# Patient Record
Sex: FEMALE | Race: OTHER | HISPANIC OR LATINO | Employment: FULL TIME | URBAN - METROPOLITAN AREA
[De-identification: names, ages, dates, MRNs, and addresses within clinical notes are randomized per-mention and may not be internally consistent; named-entity substitution may affect disease eponyms.]

---

## 2021-07-05 LAB
BILIRUB SERPL-MCNC: NEGATIVE MG/DL
BLOOD URINE, POC: NEGATIVE
CLARITY, POC UA: CLEAR
COLOR, POC UA: YELLOW
GLUCOSE UR QL STRIP: NEGATIVE
KETONES UR QL STRIP: NEGATIVE
LEUKOCYTE EST, POC UA: NORMAL
NITRITE, POC UA: NEGATIVE
PH, POC UA: 5
POC BETA-HCG (QUAL): NEGATIVE
PROTEIN, POC: NEGATIVE
SPECIFIC GRAVITY, POC UA: 1.02
UROBILINOGEN, POC UA: NORMAL

## 2022-04-10 ENCOUNTER — HISTORICAL (OUTPATIENT)
Dept: ADMINISTRATIVE | Facility: HOSPITAL | Age: 22
End: 2022-04-10
Payer: COMMERCIAL

## 2022-04-29 VITALS
DIASTOLIC BLOOD PRESSURE: 80 MMHG | HEIGHT: 63 IN | OXYGEN SATURATION: 99 % | SYSTOLIC BLOOD PRESSURE: 123 MMHG | WEIGHT: 155 LBS | BODY MASS INDEX: 27.46 KG/M2

## 2022-07-13 ENCOUNTER — OFFICE VISIT (OUTPATIENT)
Dept: URGENT CARE | Facility: CLINIC | Age: 22
End: 2022-07-13
Payer: COMMERCIAL

## 2022-07-13 VITALS
HEART RATE: 106 BPM | WEIGHT: 150 LBS | RESPIRATION RATE: 20 BRPM | TEMPERATURE: 100 F | BODY MASS INDEX: 26.58 KG/M2 | HEIGHT: 63 IN | SYSTOLIC BLOOD PRESSURE: 118 MMHG | DIASTOLIC BLOOD PRESSURE: 75 MMHG | OXYGEN SATURATION: 96 %

## 2022-07-13 DIAGNOSIS — J02.9 SORE THROAT: Primary | ICD-10-CM

## 2022-07-13 DIAGNOSIS — U07.1 COVID-19: ICD-10-CM

## 2022-07-13 LAB
CTP QC/QA: YES
SARS-COV-2 RDRP RESP QL NAA+PROBE: POSITIVE

## 2022-07-13 PROCEDURE — 3078F DIAST BP <80 MM HG: CPT | Mod: CPTII,,, | Performed by: PHYSICIAN ASSISTANT

## 2022-07-13 PROCEDURE — 1159F PR MEDICATION LIST DOCUMENTED IN MEDICAL RECORD: ICD-10-PCS | Mod: CPTII,,, | Performed by: PHYSICIAN ASSISTANT

## 2022-07-13 PROCEDURE — 3008F BODY MASS INDEX DOCD: CPT | Mod: CPTII,,, | Performed by: PHYSICIAN ASSISTANT

## 2022-07-13 PROCEDURE — 3008F PR BODY MASS INDEX (BMI) DOCUMENTED: ICD-10-PCS | Mod: CPTII,,, | Performed by: PHYSICIAN ASSISTANT

## 2022-07-13 PROCEDURE — 3074F PR MOST RECENT SYSTOLIC BLOOD PRESSURE < 130 MM HG: ICD-10-PCS | Mod: CPTII,,, | Performed by: PHYSICIAN ASSISTANT

## 2022-07-13 PROCEDURE — U0002 COVID-19 LAB TEST NON-CDC: HCPCS | Mod: QW,,, | Performed by: PHYSICIAN ASSISTANT

## 2022-07-13 PROCEDURE — 1159F MED LIST DOCD IN RCRD: CPT | Mod: CPTII,,, | Performed by: PHYSICIAN ASSISTANT

## 2022-07-13 PROCEDURE — 3074F SYST BP LT 130 MM HG: CPT | Mod: CPTII,,, | Performed by: PHYSICIAN ASSISTANT

## 2022-07-13 PROCEDURE — 1160F RVW MEDS BY RX/DR IN RCRD: CPT | Mod: CPTII,,, | Performed by: PHYSICIAN ASSISTANT

## 2022-07-13 PROCEDURE — U0002: ICD-10-PCS | Mod: QW,,, | Performed by: PHYSICIAN ASSISTANT

## 2022-07-13 PROCEDURE — 99203 PR OFFICE/OUTPT VISIT, NEW, LEVL III, 30-44 MIN: ICD-10-PCS | Mod: ,,, | Performed by: PHYSICIAN ASSISTANT

## 2022-07-13 PROCEDURE — 1160F PR REVIEW ALL MEDS BY PRESCRIBER/CLIN PHARMACIST DOCUMENTED: ICD-10-PCS | Mod: CPTII,,, | Performed by: PHYSICIAN ASSISTANT

## 2022-07-13 PROCEDURE — 3078F PR MOST RECENT DIASTOLIC BLOOD PRESSURE < 80 MM HG: ICD-10-PCS | Mod: CPTII,,, | Performed by: PHYSICIAN ASSISTANT

## 2022-07-13 PROCEDURE — 99203 OFFICE O/P NEW LOW 30 MIN: CPT | Mod: ,,, | Performed by: PHYSICIAN ASSISTANT

## 2022-07-13 NOTE — PATIENT INSTRUCTIONS
Drink plenty of fluids    Get plenty of rest.    Follow-up with your primary care doctor      Go to emergency department with any significant change or worsening symptoms.    Tylenol or Motrin as needed for fever.     Please quarantine for 5 days. On day 6 of illness you can return to work/school as long as you have not experienced fever in the last 24 hours.     Please add vitamin C, vitamin D, and Zinc if you do not already take these.

## 2022-07-13 NOTE — PROGRESS NOTES
"Subjective:       Patient ID: Aubrie Donald is a 22 y.o. female.    Vitals:  height is 5' 3" (1.6 m) and weight is 68 kg (150 lb). Her temperature is 99.7 °F (37.6 °C). Her blood pressure is 118/75 and her pulse is 106. Her respiration is 20 and oxygen saturation is 96%.     Chief Complaint: Sinus Problem    Dad tested positive today. Pt started having symptoms yesterday: headache, nasal congestion, sore throat. Pt took Nyquil.  Denies any coughing neck stiffness rash shortness of breath GI symptoms.    ROS    Objective:      Physical Exam   Constitutional: She is oriented to person, place, and time. She appears well-developed. She is cooperative.  Non-toxic appearance. She does not appear ill. No distress.   HENT:   Head: Normocephalic and atraumatic.   Ears:   Right Ear: Hearing normal.   Left Ear: Hearing normal.   Eyes: Conjunctivae and lids are normal. No scleral icterus.   Neck: Trachea normal and phonation normal. Neck supple. No edema present. No erythema present. No neck rigidity present.   Cardiovascular: Normal rate, regular rhythm, normal heart sounds and normal pulses.   Pulmonary/Chest: Effort normal and breath sounds normal. No respiratory distress. She has no decreased breath sounds. She has no rhonchi.   Abdominal: Normal appearance.   Musculoskeletal: Normal range of motion.         General: No deformity. Normal range of motion.   Neurological: She is alert and oriented to person, place, and time. She exhibits normal muscle tone. Coordination normal.   Skin: Skin is warm, dry, intact, not diaphoretic and not pale.   Psychiatric: Her speech is normal and behavior is normal. Judgment and thought content normal.   Nursing note and vitals reviewed.         Previous History      Review of patient's allergies indicates:  No Known Allergies    Past Medical History:   Diagnosis Date    Infection of left eye      Current Outpatient Medications   Medication Instructions    estradiol cypionate " "(DEPO-ESTRADIOL) 5 mg/mL injection Intramuscular, Every 28 days     Past Surgical History:   Procedure Laterality Date    WISDOM TOOTH EXTRACTION       Family History   Problem Relation Age of Onset    No Known Problems Mother     Hypertension Father     Hyperlipidemia Father     No Known Problems Sister     No Known Problems Brother        Social History     Tobacco Use    Smoking status: Never Smoker    Smokeless tobacco: Never Used   Substance Use Topics    Alcohol use: Yes    Drug use: Never        Physical Exam      Vital Signs Reviewed   /75   Pulse 106   Temp 99.7 °F (37.6 °C)   Resp 20   Ht 5' 3" (1.6 m)   Wt 68 kg (150 lb)   SpO2 96%   BMI 26.57 kg/m²        Procedures    Procedures     Labs     Results for orders placed or performed in visit on 07/13/22   POCT COVID-19 Rapid Screening   Result Value Ref Range    POC Rapid COVID Positive (A) Negative     Acceptable Yes          Assessment:       1. Sore throat    2. COVID-19          Plan:         Sore throat  -     POCT COVID-19 Rapid Screening    COVID-19    Drink plenty of fluids    Get plenty of rest.    Follow-up with your primary care doctor      Go to emergency department with any significant change or worsening symptoms.    Tylenol or Motrin as needed for fever.     Please quarantine for 5 days. On day 6 of illness you can return to work/school as long as you have not experienced fever in the last 24 hours.     Please add vitamin C, vitamin D, and Zinc if you do not already take these.                  "

## 2022-09-15 ENCOUNTER — HISTORICAL (OUTPATIENT)
Dept: ADMINISTRATIVE | Facility: HOSPITAL | Age: 22
End: 2022-09-15
Payer: COMMERCIAL

## 2022-12-01 ENCOUNTER — OFFICE VISIT (OUTPATIENT)
Dept: URGENT CARE | Facility: CLINIC | Age: 22
End: 2022-12-01
Payer: COMMERCIAL

## 2022-12-01 VITALS
DIASTOLIC BLOOD PRESSURE: 63 MMHG | TEMPERATURE: 99 F | SYSTOLIC BLOOD PRESSURE: 108 MMHG | OXYGEN SATURATION: 100 % | RESPIRATION RATE: 18 BRPM | BODY MASS INDEX: 26.58 KG/M2 | HEIGHT: 63 IN | HEART RATE: 66 BPM | WEIGHT: 150 LBS

## 2022-12-01 DIAGNOSIS — L60.0 INGROWN TOENAIL: Primary | ICD-10-CM

## 2022-12-01 PROCEDURE — 11730 NAIL REMOVAL: ICD-10-PCS | Mod: S$PBB,,, | Performed by: PHYSICIAN ASSISTANT

## 2022-12-01 PROCEDURE — 99213 PR OFFICE/OUTPT VISIT, EST, LEVL III, 20-29 MIN: ICD-10-PCS | Mod: S$PBB,25,, | Performed by: PHYSICIAN ASSISTANT

## 2022-12-01 PROCEDURE — 1159F PR MEDICATION LIST DOCUMENTED IN MEDICAL RECORD: ICD-10-PCS | Mod: CPTII,,, | Performed by: PHYSICIAN ASSISTANT

## 2022-12-01 PROCEDURE — 3008F BODY MASS INDEX DOCD: CPT | Mod: CPTII,,, | Performed by: PHYSICIAN ASSISTANT

## 2022-12-01 PROCEDURE — 3078F PR MOST RECENT DIASTOLIC BLOOD PRESSURE < 80 MM HG: ICD-10-PCS | Mod: CPTII,,, | Performed by: PHYSICIAN ASSISTANT

## 2022-12-01 PROCEDURE — 3008F PR BODY MASS INDEX (BMI) DOCUMENTED: ICD-10-PCS | Mod: CPTII,,, | Performed by: PHYSICIAN ASSISTANT

## 2022-12-01 PROCEDURE — 3074F PR MOST RECENT SYSTOLIC BLOOD PRESSURE < 130 MM HG: ICD-10-PCS | Mod: CPTII,,, | Performed by: PHYSICIAN ASSISTANT

## 2022-12-01 PROCEDURE — 3074F SYST BP LT 130 MM HG: CPT | Mod: CPTII,,, | Performed by: PHYSICIAN ASSISTANT

## 2022-12-01 PROCEDURE — 1159F MED LIST DOCD IN RCRD: CPT | Mod: CPTII,,, | Performed by: PHYSICIAN ASSISTANT

## 2022-12-01 PROCEDURE — 1160F PR REVIEW ALL MEDS BY PRESCRIBER/CLIN PHARMACIST DOCUMENTED: ICD-10-PCS | Mod: CPTII,,, | Performed by: PHYSICIAN ASSISTANT

## 2022-12-01 PROCEDURE — 3078F DIAST BP <80 MM HG: CPT | Mod: CPTII,,, | Performed by: PHYSICIAN ASSISTANT

## 2022-12-01 PROCEDURE — 99213 OFFICE O/P EST LOW 20 MIN: CPT | Mod: S$PBB,25,, | Performed by: PHYSICIAN ASSISTANT

## 2022-12-01 PROCEDURE — 1160F RVW MEDS BY RX/DR IN RCRD: CPT | Mod: CPTII,,, | Performed by: PHYSICIAN ASSISTANT

## 2022-12-01 PROCEDURE — 11730 AVULSION NAIL PLATE SIMPLE 1: CPT | Mod: S$PBB,,, | Performed by: PHYSICIAN ASSISTANT

## 2022-12-01 RX ORDER — HYDROCODONE BITARTRATE AND ACETAMINOPHEN 7.5; 325 MG/1; MG/1
1 TABLET ORAL EVERY 6 HOURS PRN
Qty: 12 TABLET | Refills: 0 | Status: SHIPPED | OUTPATIENT
Start: 2022-12-01 | End: 2022-12-04

## 2022-12-01 RX ORDER — MUPIROCIN 20 MG/G
OINTMENT TOPICAL 3 TIMES DAILY
Qty: 1 EACH | Refills: 1 | Status: SHIPPED | OUTPATIENT
Start: 2022-12-01 | End: 2022-12-11

## 2022-12-01 RX ORDER — SULFAMETHOXAZOLE AND TRIMETHOPRIM 800; 160 MG/1; MG/1
1 TABLET ORAL 2 TIMES DAILY
Qty: 20 TABLET | Refills: 0 | Status: SHIPPED | OUTPATIENT
Start: 2022-12-01 | End: 2022-12-11

## 2022-12-01 NOTE — PROCEDURES
Nail Removal    Date/Time: 12/1/2022 1:00 PM  Performed by: West Lafleur PA-C  Authorized by: West Lalfeur PA-C     Consent Done?:  Yes (Written)  Location:     Location:  Left foot    Location detail:  Left big toe  Anesthesia:     Anesthesia:  Digital block    Local anesthetic:  Lidocaine 2% without epinephrine    Anesthetic total (ml):  3  Procedure Details:     Preparation:  Skin prepped with alcohol and skin prepped with Betadine    Amount removed:  1/4    Side:  Lateral    Wedge excision of skin of nail fold: No      Nail bed sutured?: No      Nail matrix removed:  None    Removed nail replaced and anchored: No      Dressing applied:  Antibiotic ointment and dressing applied    Patient tolerance:  Patient tolerated the procedure well with no immediate complications

## 2022-12-01 NOTE — PROGRESS NOTES
"Subjective:       Patient ID: Aubrie Donald is a 22 y.o. female.    Vitals:  height is 5' 3" (1.6 m) and weight is 68 kg (150 lb). Her temperature is 99.1 °F (37.3 °C). Her blood pressure is 108/63 and her pulse is 66. Her respiration is 18 and oxygen saturation is 100%.     Chief Complaint: Ingrown Toenail (Pt c/o of ingrown toenail to left great toe x3 weeks. )    Pt c/o a 3 week hx of an ingrown toenail to the left 1st toe. No relief from wound care at home. Denies any fever or n/t.       Skin:  Positive for erythema.     Objective:      Physical Exam   HENT:   Head: Normocephalic and atraumatic.   Neck: Neck supple.   Abdominal: Normal appearance.   Neurological: She is alert.   Skin: erythema and lesion     Ingrown toenail left 1st toe adjacent soft tissue erythema and mild swelling. N/v intact cr brisk. No felon.     See procedure note.            Previous History      Review of patient's allergies indicates:  No Known Allergies    Past Medical History:   Diagnosis Date    Infection of left eye      Current Outpatient Medications   Medication Instructions    estradiol cypionate (DEPO-ESTRADIOL) 5 mg/mL injection Intramuscular, Every 28 days    HYDROcodone-acetaminophen (NORCO) 7.5-325 mg per tablet 1 tablet, Oral, Every 6 hours PRN    mupirocin (BACTROBAN) 2 % ointment Topical (Top), 3 times daily    sulfamethoxazole-trimethoprim 800-160mg (BACTRIM DS) 800-160 mg Tab 1 tablet, Oral, 2 times daily     Past Surgical History:   Procedure Laterality Date    WISDOM TOOTH EXTRACTION       Family History   Problem Relation Age of Onset    No Known Problems Mother     Hypertension Father     Hyperlipidemia Father     No Known Problems Sister     No Known Problems Brother        Social History     Tobacco Use    Smoking status: Never    Smokeless tobacco: Never   Substance Use Topics    Alcohol use: Yes    Drug use: Never        Physical Exam      Vital Signs Reviewed   /63   Pulse 66   Temp 99.1 °F (37.3 °C)  " " Resp 18   Ht 5' 3" (1.6 m)   Wt 68 kg (150 lb)   SpO2 100%   BMI 26.57 kg/m²        Procedures    Procedures     Labs     Results for orders placed or performed in visit on 09/15/22   POCT urine pregnancy   Result Value Ref Range    POC Beta-HCG (Qual) Negative    POCT Urinalysis   Result Value Ref Range    Glucose, UA Negative     Bilirubin, POC Negative     Ketones, UA Negative     Color, UA Yellow     Clarity, UA Clear     Spec Grav UA 1.025     Blood, UA Negative     pH, UA 5     Protein, POC Negative     Urobilinogen, UA 0.2 mg/dl     Nitrite, UA Negative     Leukocytes, UA Small      Assessment:       1. Ingrown toenail          Plan:         Ingrown toenail  -     HYDROcodone-acetaminophen (NORCO) 7.5-325 mg per tablet; Take 1 tablet by mouth every 6 (six) hours as needed for Pain.  Dispense: 12 tablet; Refill: 0  -     mupirocin (BACTROBAN) 2 % ointment; Apply topically 3 (three) times daily. for 10 days  Dispense: 1 each; Refill: 1  -     sulfamethoxazole-trimethoprim 800-160mg (BACTRIM DS) 800-160 mg Tab; Take 1 tablet by mouth 2 (two) times daily. for 10 days  Dispense: 20 tablet; Refill: 0       Wound Care: Twice daily wound care as discussed.   Pain: Take OTC Tylenol or Ibuprofen per package instructions as needed for pain.  Loosen the bandage if needed.   Follow up here in 2 days.  Present to the Emergency Department for any significant change or worsening symptoms including worsening redness, swelling, purulent discharge, fever, body aches, or chills.              "

## 2022-12-01 NOTE — PATIENT INSTRUCTIONS
Wound Care: Twice daily wound care as discussed.   Pain: Take OTC Tylenol or Ibuprofen per package instructions as needed for pain.  Loosen the bandage if needed.   Follow up here in 2 days.  Present to the Emergency Department for any significant change or worsening symptoms including worsening redness, swelling, purulent discharge, fever, body aches, or chills.

## 2022-12-27 ENCOUNTER — OFFICE VISIT (OUTPATIENT)
Dept: URGENT CARE | Facility: CLINIC | Age: 22
End: 2022-12-27
Payer: COMMERCIAL

## 2022-12-27 VITALS
BODY MASS INDEX: 26.58 KG/M2 | HEART RATE: 82 BPM | TEMPERATURE: 98 F | WEIGHT: 150 LBS | DIASTOLIC BLOOD PRESSURE: 70 MMHG | OXYGEN SATURATION: 98 % | HEIGHT: 63 IN | SYSTOLIC BLOOD PRESSURE: 115 MMHG

## 2022-12-27 DIAGNOSIS — R10.31 RIGHT LOWER QUADRANT PAIN: Primary | ICD-10-CM

## 2022-12-27 DIAGNOSIS — R50.9 FEVER, UNSPECIFIED FEVER CAUSE: ICD-10-CM

## 2022-12-27 LAB
CTP QC/QA: YES
CTP QC/QA: YES
POC MOLECULAR INFLUENZA A AGN: NEGATIVE
POC MOLECULAR INFLUENZA B AGN: NEGATIVE
SARS-COV-2 RDRP RESP QL NAA+PROBE: NEGATIVE

## 2022-12-27 PROCEDURE — 87502 POCT INFLUENZA A/B MOLECULAR: ICD-10-PCS | Mod: QW,,, | Performed by: FAMILY MEDICINE

## 2022-12-27 PROCEDURE — 1160F PR REVIEW ALL MEDS BY PRESCRIBER/CLIN PHARMACIST DOCUMENTED: ICD-10-PCS | Mod: CPTII,,, | Performed by: FAMILY MEDICINE

## 2022-12-27 PROCEDURE — 3008F BODY MASS INDEX DOCD: CPT | Mod: CPTII,,, | Performed by: FAMILY MEDICINE

## 2022-12-27 PROCEDURE — 3078F DIAST BP <80 MM HG: CPT | Mod: CPTII,,, | Performed by: FAMILY MEDICINE

## 2022-12-27 PROCEDURE — 3074F PR MOST RECENT SYSTOLIC BLOOD PRESSURE < 130 MM HG: ICD-10-PCS | Mod: CPTII,,, | Performed by: FAMILY MEDICINE

## 2022-12-27 PROCEDURE — 1159F MED LIST DOCD IN RCRD: CPT | Mod: CPTII,,, | Performed by: FAMILY MEDICINE

## 2022-12-27 PROCEDURE — 1159F PR MEDICATION LIST DOCUMENTED IN MEDICAL RECORD: ICD-10-PCS | Mod: CPTII,,, | Performed by: FAMILY MEDICINE

## 2022-12-27 PROCEDURE — 99213 OFFICE O/P EST LOW 20 MIN: CPT | Mod: ,,, | Performed by: FAMILY MEDICINE

## 2022-12-27 PROCEDURE — 87635 SARS-COV-2 COVID-19 AMP PRB: CPT | Mod: QW,,, | Performed by: FAMILY MEDICINE

## 2022-12-27 PROCEDURE — 1160F RVW MEDS BY RX/DR IN RCRD: CPT | Mod: CPTII,,, | Performed by: FAMILY MEDICINE

## 2022-12-27 PROCEDURE — 87635: ICD-10-PCS | Mod: QW,,, | Performed by: FAMILY MEDICINE

## 2022-12-27 PROCEDURE — 3008F PR BODY MASS INDEX (BMI) DOCUMENTED: ICD-10-PCS | Mod: CPTII,,, | Performed by: FAMILY MEDICINE

## 2022-12-27 PROCEDURE — 3074F SYST BP LT 130 MM HG: CPT | Mod: CPTII,,, | Performed by: FAMILY MEDICINE

## 2022-12-27 PROCEDURE — 3078F PR MOST RECENT DIASTOLIC BLOOD PRESSURE < 80 MM HG: ICD-10-PCS | Mod: CPTII,,, | Performed by: FAMILY MEDICINE

## 2022-12-27 PROCEDURE — 87502 INFLUENZA DNA AMP PROBE: CPT | Mod: QW,,, | Performed by: FAMILY MEDICINE

## 2022-12-27 PROCEDURE — 99213 PR OFFICE/OUTPT VISIT, EST, LEVL III, 20-29 MIN: ICD-10-PCS | Mod: ,,, | Performed by: FAMILY MEDICINE

## 2022-12-27 NOTE — PROGRESS NOTES
"Subjective:       Patient ID: Aubrie Donald is a 22 y.o. female.    Vitals:  height is 5' 3" (1.6 m) and weight is 68 kg (150 lb). Her temperature is 98.2 °F (36.8 °C). Her blood pressure is 115/70 and her pulse is 82. Her oxygen saturation is 98%.     Chief Complaint: Abdominal Pain, Nausea, and Fever    22-year-old female presents to clinic complaining of right lower quadrant pain with nausea and fever T-max of 102° since yesterday.  Patient does have her appendix.    Abdominal Pain  Associated symptoms include a fever and nausea.   Nausea  Associated symptoms include abdominal pain, a fever and nausea.   Fever   Associated symptoms include abdominal pain and nausea.     Constitution: Positive for fever.   HENT: Negative.     Cardiovascular: Negative.    Eyes: Negative.    Respiratory: Negative.     Gastrointestinal:  Positive for abdominal pain and nausea.   Genitourinary: Negative.    Musculoskeletal: Negative.    Skin: Negative.    Allergic/Immunologic: Negative.    Neurological: Negative.    Hematologic/Lymphatic: Negative.      Objective:      Physical Exam   Constitutional: She is oriented to person, place, and time.  Non-toxic appearance. She does not appear ill. No distress.   Pulmonary/Chest: Effort normal.   Abdominal: Normal appearance. There is abdominal tenderness (tenderness to palpation right lower quadrant area). There is no rebound and no guarding.   Neurological: She is alert and oriented to person, place, and time.   Skin: Skin is not diaphoretic.   Psychiatric: Her behavior is normal. Mood, judgment and thought content normal.   Nursing note and vitals reviewed.         Previous History      Review of patient's allergies indicates:  No Known Allergies    Past Medical History:   Diagnosis Date    Infection of left eye      Current Outpatient Medications   Medication Instructions    estradiol cypionate (DEPO-ESTRADIOL) 5 mg/mL injection Intramuscular, Every 28 days     Past Surgical History: " "  Procedure Laterality Date    WISDOM TOOTH EXTRACTION       Family History   Problem Relation Age of Onset    No Known Problems Mother     Hypertension Father     Hyperlipidemia Father     No Known Problems Sister     No Known Problems Brother        Social History     Tobacco Use    Smoking status: Never    Smokeless tobacco: Never   Substance Use Topics    Alcohol use: Yes    Drug use: Never        Physical Exam      Vital Signs Reviewed   /70   Pulse 82   Temp 98.2 °F (36.8 °C)   Ht 5' 3" (1.6 m)   Wt 68 kg (150 lb)   SpO2 98%   BMI 26.57 kg/m²        Procedures    Procedures     Labs     Results for orders placed or performed in visit on 09/15/22   POCT urine pregnancy   Result Value Ref Range    POC Beta-HCG (Qual) Negative    POCT Urinalysis   Result Value Ref Range    Glucose, UA Negative     Bilirubin, POC Negative     Ketones, UA Negative     Color, UA Yellow     Clarity, UA Clear     Spec Grav UA 1.025     Blood, UA Negative     pH, UA 5     Protein, POC Negative     Urobilinogen, UA 0.2 mg/dl     Nitrite, UA Negative     Leukocytes, UA Small        Assessment:       1. Right lower quadrant pain    2. Fever, unspecified fever cause          Plan:       Recommend further evaluation in the emergency department of your choice  Right lower quadrant pain    Fever, unspecified fever cause  -     POCT COVID-19 Rapid Screening  -     POCT Influenza A/B MOLECULAR                     "

## 2023-04-27 ENCOUNTER — OFFICE VISIT (OUTPATIENT)
Dept: URGENT CARE | Facility: CLINIC | Age: 23
End: 2023-04-27
Payer: COMMERCIAL

## 2023-04-27 VITALS
WEIGHT: 150 LBS | HEART RATE: 87 BPM | SYSTOLIC BLOOD PRESSURE: 111 MMHG | OXYGEN SATURATION: 98 % | HEIGHT: 63 IN | RESPIRATION RATE: 18 BRPM | TEMPERATURE: 100 F | DIASTOLIC BLOOD PRESSURE: 63 MMHG | BODY MASS INDEX: 26.58 KG/M2

## 2023-04-27 DIAGNOSIS — J02.9 SORE THROAT: Primary | ICD-10-CM

## 2023-04-27 LAB
CTP QC/QA: YES
MOLECULAR STREP A: NEGATIVE
POC MOLECULAR INFLUENZA A AGN: NEGATIVE
POC MOLECULAR INFLUENZA B AGN: NEGATIVE
SARS-COV-2 RDRP RESP QL NAA+PROBE: NEGATIVE

## 2023-04-27 PROCEDURE — 87635 SARS-COV-2 COVID-19 AMP PRB: CPT | Mod: QW,,, | Performed by: FAMILY MEDICINE

## 2023-04-27 PROCEDURE — 87635: ICD-10-PCS | Mod: QW,,, | Performed by: FAMILY MEDICINE

## 2023-04-27 PROCEDURE — 99213 PR OFFICE/OUTPT VISIT, EST, LEVL III, 20-29 MIN: ICD-10-PCS | Mod: ,,, | Performed by: FAMILY MEDICINE

## 2023-04-27 PROCEDURE — 99213 OFFICE O/P EST LOW 20 MIN: CPT | Mod: ,,, | Performed by: FAMILY MEDICINE

## 2023-04-27 PROCEDURE — 87502 POCT INFLUENZA A/B MOLECULAR: ICD-10-PCS | Mod: QW,,, | Performed by: FAMILY MEDICINE

## 2023-04-27 PROCEDURE — 87651 STREP A DNA AMP PROBE: CPT | Mod: QW,,, | Performed by: FAMILY MEDICINE

## 2023-04-27 PROCEDURE — 87651 POCT STREP A MOLECULAR: ICD-10-PCS | Mod: QW,,, | Performed by: FAMILY MEDICINE

## 2023-04-27 PROCEDURE — 87502 INFLUENZA DNA AMP PROBE: CPT | Mod: QW,,, | Performed by: FAMILY MEDICINE

## 2023-04-27 RX ORDER — MEDROXYPROGESTERONE ACETATE 150 MG/ML
150 INJECTION, SUSPENSION INTRAMUSCULAR
COMMUNITY
Start: 2023-03-01

## 2023-04-27 NOTE — PATIENT INSTRUCTIONS
Negative COVID flu and strep  Since your symptoms began less than 6 hours ago, I would recommend you return tomorrow as a nurse visit to be retested and we will treat accordingly then  In the meantime, monitor for fever.  Tylenol or ibuprofen as needed for fever or headache.  Rest and hydrate.

## 2023-04-27 NOTE — PROGRESS NOTES
"Subjective:      Patient ID: Aubrie Donald is a 23 y.o. female.    Vitals:  height is 5' 3" (1.6 m) and weight is 68 kg (150 lb). Her temperature is 99.8 °F (37.7 °C). Her blood pressure is 111/63 and her pulse is 87. Her respiration is 18 and oxygen saturation is 98%.     Chief Complaint: Sore Throat    23 y.o. female presents to clinic w/ c/o fever (subjective), h/a, sore throat x3.5h. Alleviating factors used include AlkaSeltzer and Tylenol w/ no improvement. Denies neck stiffness, body aches, chills, cough, congestion, wheezing, SOB, CP, N/V/D, abdominal pain and rash.    Sore Throat   Associated symptoms include headaches.   Constitution: Negative.   HENT:  Positive for sore throat.    Cardiovascular: Negative.    Eyes: Negative.    Respiratory: Negative.     Gastrointestinal: Negative.    Genitourinary: Negative.    Musculoskeletal: Negative.    Skin: Negative.    Allergic/Immunologic: Negative.    Neurological:  Positive for headaches.   Hematologic/Lymphatic: Negative.     Objective:     Physical Exam   Constitutional: She is oriented to person, place, and time. She appears well-developed. She is cooperative.  Non-toxic appearance. She does not appear ill. No distress.   HENT:   Head: Normocephalic and atraumatic.   Ears:   Right Ear: Hearing and external ear normal.   Left Ear: Hearing and external ear normal.   Mouth/Throat: Mucous membranes are normal. Posterior oropharyngeal erythema (Postnasal drip) present.   Eyes: Conjunctivae and lids are normal.   Neck: Trachea normal and phonation normal. Neck supple. No edema present. No erythema present. No neck rigidity present.   Cardiovascular: Normal rate.   Pulmonary/Chest: Effort normal and breath sounds normal. No stridor. No respiratory distress. She has no decreased breath sounds. She has no wheezes. She has no rhonchi. She has no rales.   Abdominal: Normal appearance.   Neurological: She is alert and oriented to person, place, and time. She exhibits " "normal muscle tone. Coordination normal.   Skin: Skin is warm, dry, intact, not diaphoretic and no rash.   Psychiatric: Her speech is normal and behavior is normal. Mood, judgment and thought content normal.   Nursing note and vitals reviewed.       Previous History      Review of patient's allergies indicates:  No Known Allergies    Past Medical History:   Diagnosis Date    Infection of left eye      Current Outpatient Medications   Medication Instructions    estradiol cypionate (DEPO-ESTRADIOL) 5 mg/mL injection Intramuscular, Every 28 days    medroxyPROGESTERone (DEPO-PROVERA) 150 mg, Intramuscular, Every 3 months     Past Surgical History:   Procedure Laterality Date    APPENDECTOMY      WISDOM TOOTH EXTRACTION       Family History   Problem Relation Age of Onset    No Known Problems Mother     Hypertension Father     Hyperlipidemia Father     No Known Problems Sister     No Known Problems Brother        Social History     Tobacco Use    Smoking status: Never    Smokeless tobacco: Never   Substance Use Topics    Alcohol use: Yes    Drug use: Never        Physical Exam      Vital Signs Reviewed   /63   Pulse 87   Temp 99.8 °F (37.7 °C)   Resp 18   Ht 5' 3" (1.6 m)   Wt 68 kg (150 lb)   SpO2 98%   BMI 26.57 kg/m²        Procedures    Procedures     Labs     Results for orders placed or performed in visit on 12/27/22   POCT COVID-19 Rapid Screening   Result Value Ref Range    POC Rapid COVID Negative Negative     Acceptable Yes    POCT Influenza A/B MOLECULAR   Result Value Ref Range    POC Molecular Influenza A Ag Negative Negative, Not Reported    POC Molecular Influenza B Ag Negative Negative, Not Reported     Acceptable Yes        Assessment:     1. Sore throat        Plan:   Negative COVID flu and strep  Since your symptoms began less than 6 hours ago, I would recommend you return tomorrow as a nurse visit to be retested and you will be treated accordingly then  In " the meantime, monitor for fever.  Tylenol or ibuprofen as needed for fever or headache.  Rest and hydrate.      Sore throat  -     POCT COVID-19 Rapid Screening  -     POCT Influenza A/B MOLECULAR  -     POCT Strep A, Molecular

## 2023-04-28 ENCOUNTER — CLINICAL SUPPORT (OUTPATIENT)
Dept: URGENT CARE | Facility: CLINIC | Age: 23
End: 2023-04-28
Payer: COMMERCIAL

## 2023-04-28 VITALS — TEMPERATURE: 99 F

## 2023-04-28 DIAGNOSIS — R50.9 FEVER, UNSPECIFIED FEVER CAUSE: Primary | ICD-10-CM

## 2023-04-28 LAB
CTP QC/QA: YES
SARS-COV-2 RDRP RESP QL NAA+PROBE: NEGATIVE

## 2023-04-28 PROCEDURE — 87635 SARS-COV-2 COVID-19 AMP PRB: CPT | Mod: QW,,, | Performed by: PHYSICIAN ASSISTANT

## 2023-04-28 PROCEDURE — 87635: ICD-10-PCS | Mod: QW,,, | Performed by: PHYSICIAN ASSISTANT

## 2023-04-28 NOTE — LETTER
April 28, 2023      [facility]  [address]  [place] [address]  Phone: [phone]       Patient: Aubrie Donald   YOB: 2000  Date of Visit: 04/28/2023    To Whom It May Concern:    Josef Donald  was at [facility] on 04/28/2023.  The patient may return to work/school on 4/29/23 with no restrictions. If you have any questions or concerns, or if I can be of further assistance, please do not hesitate to contact me.    Sincerely,    Ramon Bosch LPN

## 2023-04-28 NOTE — PROGRESS NOTES
Subjective:      Patient ID: Aubrie Donald is a 23 y.o. female.    Vitals:  vitals were not taken for this visit.     Chief Complaint: No chief complaint on file.    Patient reported to clinic as nurse visit for re-swab for Covid. Patient is aware of results. CHANTAL POMPA   Objective:     Physical Exam    Assessment:     No diagnosis found.    Plan:       There are no diagnoses linked to this encounter.

## 2023-11-30 ENCOUNTER — OFFICE VISIT (OUTPATIENT)
Dept: URGENT CARE | Facility: CLINIC | Age: 23
End: 2023-11-30
Payer: COMMERCIAL

## 2023-11-30 VITALS
BODY MASS INDEX: 25.34 KG/M2 | RESPIRATION RATE: 18 BRPM | TEMPERATURE: 100 F | SYSTOLIC BLOOD PRESSURE: 107 MMHG | WEIGHT: 143 LBS | OXYGEN SATURATION: 99 % | HEART RATE: 92 BPM | HEIGHT: 63 IN | DIASTOLIC BLOOD PRESSURE: 71 MMHG

## 2023-11-30 DIAGNOSIS — R19.7 DIARRHEA, UNSPECIFIED TYPE: ICD-10-CM

## 2023-11-30 DIAGNOSIS — R11.2 NAUSEA AND VOMITING, UNSPECIFIED VOMITING TYPE: Primary | ICD-10-CM

## 2023-11-30 DIAGNOSIS — J02.9 SORE THROAT: ICD-10-CM

## 2023-11-30 LAB
CTP QC/QA: YES
CTP QC/QA: YES
MOLECULAR STREP A: NEGATIVE
POC MOLECULAR INFLUENZA A AGN: NEGATIVE
POC MOLECULAR INFLUENZA B AGN: NEGATIVE

## 2023-11-30 PROCEDURE — 99213 OFFICE O/P EST LOW 20 MIN: CPT | Mod: ,,,

## 2023-11-30 PROCEDURE — 87651 STREP A DNA AMP PROBE: CPT | Mod: QW,,,

## 2023-11-30 PROCEDURE — 87502 POCT INFLUENZA A/B MOLECULAR: ICD-10-PCS | Mod: QW,,,

## 2023-11-30 PROCEDURE — 99213 PR OFFICE/OUTPT VISIT, EST, LEVL III, 20-29 MIN: ICD-10-PCS | Mod: ,,,

## 2023-11-30 PROCEDURE — 87502 INFLUENZA DNA AMP PROBE: CPT | Mod: QW,,,

## 2023-11-30 PROCEDURE — 87651 POCT STREP A MOLECULAR: ICD-10-PCS | Mod: QW,,,

## 2023-11-30 RX ORDER — ONDANSETRON 4 MG/1
4 TABLET, ORALLY DISINTEGRATING ORAL EVERY 8 HOURS PRN
Qty: 15 TABLET | Refills: 0 | Status: SHIPPED | OUTPATIENT
Start: 2023-11-30

## 2023-11-30 NOTE — PROGRESS NOTES
"Subjective:      Patient ID: Aubrie Donald is a 23 y.o. female.    Vitals:  height is 5' 3" (1.6 m) and weight is 64.9 kg (143 lb). Her temperature is 99.7 °F (37.6 °C). Her blood pressure is 107/71 and her pulse is 92. Her respiration is 18 and oxygen saturation is 99%.     Chief Complaint: Emesis     Patient is a 23 y.o. female who presents to urgent care with complaints of sore throat that began yesterday, patient reports associated nausea, vomiting, diarrhea that began last night.  She does report a proximally 5 episodes of vomiting with 2 episodes of loose stools.  Denies hematemesis, hematochezia, melena, abdominal pain, neck stiffness, rash, cough, congestion.  Patient denies any chance of pregnancy, denies urinary symptoms.  She does not have a menstrual cycle due to her birth control.        HENT:  Positive for sore throat.    Gastrointestinal:  Positive for nausea, vomiting and diarrhea.      Objective:     Physical Exam   Constitutional: She is oriented to person, place, and time. She appears well-developed. She is cooperative.  Non-toxic appearance. She does not appear ill. No distress.   HENT:   Head: Normocephalic and atraumatic.   Ears:   Right Ear: Hearing, tympanic membrane, external ear and ear canal normal.   Left Ear: Hearing, tympanic membrane, external ear and ear canal normal.   Nose: Nose normal. No mucosal edema, rhinorrhea or nasal deformity. No epistaxis. Right sinus exhibits no maxillary sinus tenderness and no frontal sinus tenderness. Left sinus exhibits no maxillary sinus tenderness and no frontal sinus tenderness.   Mouth/Throat: Uvula is midline, oropharynx is clear and moist and mucous membranes are normal. Mucous membranes are moist. No trismus in the jaw. Normal dentition. No uvula swelling. No oropharyngeal exudate, posterior oropharyngeal edema or posterior oropharyngeal erythema.   Eyes: Conjunctivae and lids are normal. No scleral icterus.   Neck: Trachea normal and phonation " normal. Neck supple. No edema present. No erythema present. No neck rigidity present.   Cardiovascular: Normal rate, regular rhythm, normal heart sounds and normal pulses.   Pulmonary/Chest: Effort normal and breath sounds normal. No respiratory distress. She has no decreased breath sounds. She has no rhonchi.   Abdominal: Normal appearance. She exhibits no distension. There is abdominal tenderness in the right lower quadrant. There is no rebound and no guarding.      Comments: Mild right lower quadrant tenderness   Musculoskeletal: Normal range of motion.         General: No deformity. Normal range of motion.   Lymphadenopathy:     She has no cervical adenopathy.   Neurological: She is alert and oriented to person, place, and time. She exhibits normal muscle tone. Coordination normal.   Skin: Skin is warm, dry, intact, not diaphoretic and not pale.   Psychiatric: Her speech is normal and behavior is normal. Judgment and thought content normal.   Nursing note and vitals reviewed.      Assessment:     1. Nausea and vomiting, unspecified vomiting type    2. Diarrhea, unspecified type    3. Sore throat        Plan:       Nausea and vomiting, unspecified vomiting type  -     POCT Influenza A/B Molecular  -     POCT Strep A, Molecular  -     ondansetron (ZOFRAN-ODT) 4 MG TbDL; Take 1 tablet (4 mg total) by mouth every 8 (eight) hours as needed (nausea).  Dispense: 15 tablet; Refill: 0    Diarrhea, unspecified type    Sore throat    Patient declines pregnancy testing in clinic.    There is right lower quadrant tenderness however she reports her appendix was removed 11 months ago.  Strict ER precautions given for worsening of symptoms, abdominal pain.  However as sore throat is present discussed likelihood of viral etiology.      Strict ER precautions for development or worsening of abdominal pain,worsening of symptoms.  Increase fluid intake and monitor for signs of dehydration including dark colored urine, weakness,  lethargy, dizziness, etc.   Get plenty of rest.   BRAT Diet: Begin eating a BRAT diet as tolerated (bananas, plain rice, apple sauce, plain toast).  Fever / Body Aches: Take OTC Tylenol or Motrin per package instructions as needed.   Diarrhea: Take OTC Imodium per package instructions as needed for non-bloody diarrhea.   Follow-up with your Primary Care Provider as needed.   Present to the nearest Emergency Department with any significant change or worsening symptoms.

## 2023-11-30 NOTE — PATIENT INSTRUCTIONS
Excuse for today and tomorrow  Negative flu, strep  Strict ER precautions for development or worsening of abdominal pain,worsening of symptoms.  Increase fluid intake and monitor for signs of dehydration including dark colored urine, weakness, lethargy, dizziness, etc.   Get plenty of rest.   BRAT Diet: Begin eating a BRAT diet as tolerated (bananas, plain rice, apple sauce, plain toast).  Fever / Body Aches: Take OTC Tylenol or Motrin per package instructions as needed.   Diarrhea: Take OTC Imodium per package instructions as needed for non-bloody diarrhea.   Follow-up with your Primary Care Provider as needed.   Present to the nearest Emergency Department with any significant change or worsening symptoms.

## 2024-02-17 ENCOUNTER — OFFICE VISIT (OUTPATIENT)
Dept: URGENT CARE | Facility: CLINIC | Age: 24
End: 2024-02-17
Payer: COMMERCIAL

## 2024-02-17 VITALS
HEIGHT: 63 IN | BODY MASS INDEX: 26.93 KG/M2 | HEART RATE: 75 BPM | SYSTOLIC BLOOD PRESSURE: 114 MMHG | TEMPERATURE: 98 F | OXYGEN SATURATION: 99 % | WEIGHT: 152 LBS | DIASTOLIC BLOOD PRESSURE: 78 MMHG | RESPIRATION RATE: 18 BRPM

## 2024-02-17 DIAGNOSIS — L60.0 ONYCHOCRYPTOSIS: Primary | ICD-10-CM

## 2024-02-17 PROCEDURE — 11765 WEDGE EXCISION SKN NAIL FOLD: CPT | Mod: ,,,

## 2024-02-17 PROCEDURE — 99213 OFFICE O/P EST LOW 20 MIN: CPT | Mod: 25,,,

## 2024-02-17 RX ORDER — HYDROCODONE BITARTRATE AND ACETAMINOPHEN 7.5; 325 MG/1; MG/1
1 TABLET ORAL EVERY 6 HOURS PRN
Qty: 12 TABLET | Refills: 0 | Status: SHIPPED | OUTPATIENT
Start: 2024-02-17 | End: 2024-02-20

## 2024-02-17 RX ORDER — SULFAMETHOXAZOLE AND TRIMETHOPRIM 800; 160 MG/1; MG/1
1 TABLET ORAL 2 TIMES DAILY
Qty: 14 TABLET | Refills: 0 | Status: SHIPPED | OUTPATIENT
Start: 2024-02-17 | End: 2024-02-24

## 2024-02-17 RX ORDER — MUPIROCIN 20 MG/G
OINTMENT TOPICAL 3 TIMES DAILY
Qty: 1 EACH | Refills: 1 | Status: SHIPPED | OUTPATIENT
Start: 2024-02-17 | End: 2024-02-27

## 2024-02-17 NOTE — LETTER
[facility]  [address]  [place] [address]  Phone: [phone] February 17, 2024     Patient: Aubrie Donald   YOB: 2000   Date of Visit: 2/17/2024       To Whom It May Concern:        Dannie Donald needs to wear open toe shoes until 03/02/24.     Sincerely,      Laine Gardner LPN

## 2024-02-17 NOTE — PROGRESS NOTES
"Subjective:      Patient ID: Aubrie Donald is a 24 y.o. female.    Vitals:  height is 5' 3" (1.6 m) and weight is 68.9 kg (152 lb). Her oral temperature is 97.6 °F (36.4 °C). Her blood pressure is 114/78 and her pulse is 75. Her respiration is 18 and oxygen saturation is 99%.     Chief Complaint: Other Misc (25 y/o female presents to urgent care for ingrown toenail on left big toe. Noticed about month ago and go worse.)    Patient is a 24-year-old female who presents urgent care clinic for complaints of ingrown toenail, patient reports she has had 1 years ago after a pedicure and was seen in this clinic for toenail removal.  Patient would like toenail removed in clinic today.  She does report she had a pedicure proximally 1 month ago and symptoms began since the denies fever, body aches, chills, drainage from the toe.        Skin:  Positive for erythema.      Objective:     Physical Exam   Constitutional: She is oriented to person, place, and time. She appears well-developed.   HENT:   Head: Normocephalic and atraumatic. Head is without abrasion, without contusion and without laceration.   Ears:   Right Ear: External ear normal.   Left Ear: External ear normal.   Nose: Nose normal.   Mouth/Throat: Oropharynx is clear and moist and mucous membranes are normal.   Eyes: Conjunctivae, EOM and lids are normal. Pupils are equal, round, and reactive to light.   Neck: Trachea normal and phonation normal. Neck supple.   Cardiovascular: Normal rate, regular rhythm and normal heart sounds.   Pulmonary/Chest: Effort normal and breath sounds normal. No stridor. No respiratory distress.   Musculoskeletal: Normal range of motion.         General: Normal range of motion.   Neurological: She is alert and oriented to person, place, and time.   Skin: Skin is warm, dry, intact and no rash. Capillary refill takes less than 2 seconds. erythema No abrasion, No burn, No bruising and No ecchymosis         Comments: Erythema, mild swelling " noted to the lateral aspect of the left great toe, TTP, no proceed fluctuance, no drainage or streaking of erythema.  Neurovascular intact.   Psychiatric: Her speech is normal and behavior is normal. Judgment and thought content normal.   Nursing note and vitals reviewed.      Assessment:     1. Onychocryptosis        Plan:       Onychocryptosis  -     mupirocin (BACTROBAN) 2 % ointment; Apply topically 3 (three) times daily. for 10 days  Dispense: 1 each; Refill: 1  -     sulfamethoxazole-trimethoprim 800-160mg (BACTRIM DS) 800-160 mg Tab; Take 1 tablet by mouth 2 (two) times daily. for 7 days  Dispense: 14 tablet; Refill: 0  -     HYDROcodone-acetaminophen (NORCO) 7.5-325 mg per tablet; Take 1 tablet by mouth every 6 (six) hours as needed for Pain.  Dispense: 12 tablet; Refill: 0  -     Nail Removal    Partial toenail removal, patient tolerated well, see procedure note.       Antibiotic ointment will be applied to the toe immediately after the procedure. The ointment is soothing and helps the toe to heal faster. You should apply the antibiotic ointment twice daily until the wound is completely healed.   You may shower the day after the surgery. Gently dry the area and apply antibiotic ointment after showering. Avoid baths, swimming, or soaking the toe for the next 2 weeks. Try to keep the toe clean and dry.  Your bandage will help to pad and protect the wound, while absorbing drainage from the wound. You can replace the bandage if blood or fluid soaks the bandage. Please keep the wound bandaged for at least 1 week after the surgery.  You may experience some pain after the procedure. If you experience discomfort, you can take ibuprofen (brand names: Advil, Motrin, Nuprin), three 200-mg tablets 3 times a day with food, and acetaminophen (brand name: Tylenol), two 325-mg tablets every 4 hours.  You should wear loose-fitting shoes or sneakers for the first 2 weeks after the procedure. Please avoid wearing high-heeled  or tight-fitting shoes in the future. You should avoid running, jumping, or strenuous activity for 2 weeks after the surgery. Teenagers should not participate in physical education activities for 1 to 2 weeks after the procedure.  Infection may develop in the toe during the first few weeks after the surgery. Call your doctor if you develop increasing pain, swelling, redness, or drainage from the toe.  Trimming the nails straight across the top of the nail is the best way to prevent another ingrown nail from developing. The nail must not be cut down into the corners, or picked at, or torn off. If you should develop another ingrown nail, see your doctor early, because early treatment may prevent the need for surgery.       Medications sent to pharmacy  Pain medication may cause drowsiness.  Do not drink alcohol or drive when taking it  Wash the toe with antibacterial soap twice a day followed by Bactroban ointment that you have at home.  Keep a simple dressing or Band-Aid over the toe for the next several days but keep open to air in the evenings.  Monitor for signs of infection this would include fever, increasing toe pain, increasing toe swelling or redness or pus drainage from the wound.  Seek medical attention immediately if any of these happen

## 2024-02-17 NOTE — PATIENT INSTRUCTIONS
Antibiotic ointment will be applied to the toe immediately after the procedure. The ointment is soothing and helps the toe to heal faster. You should apply the antibiotic ointment twice daily until the wound is completely healed.   You may shower the day after the surgery. Gently dry the area and apply antibiotic ointment after showering. Avoid baths, swimming, or soaking the toe for the next 2 weeks. Try to keep the toe clean and dry.  Your bandage will help to pad and protect the wound, while absorbing drainage from the wound. You can replace the bandage if blood or fluid soaks the bandage. Please keep the wound bandaged for at least 1 week after the surgery.  You may experience some pain after the procedure. If you experience discomfort, you can take ibuprofen (brand names: Advil, Motrin, Nuprin), three 200-mg tablets 3 times a day with food, and acetaminophen (brand name: Tylenol), two 325-mg tablets every 4 hours.  You should wear loose-fitting shoes or sneakers for the first 2 weeks after the procedure. Please avoid wearing high-heeled or tight-fitting shoes in the future. You should avoid running, jumping, or strenuous activity for 2 weeks after the surgery. Teenagers should not participate in physical education activities for 1 to 2 weeks after the procedure.  Infection may develop in the toe during the first few weeks after the surgery. Call your doctor if you develop increasing pain, swelling, redness, or drainage from the toe.  Trimming the nails straight across the top of the nail is the best way to prevent another ingrown nail from developing. The nail must not be cut down into the corners, or picked at, or torn off. If you should develop another ingrown nail, see your doctor early, because early treatment may prevent the need for surgery.       Medications sent to pharmacy  Pain medication may cause drowsiness.  Do not drink alcohol or drive when taking it  Wash the toe with antibacterial soap twice  a day followed by Bactroban ointment that you have at home.  Keep a simple dressing or Band-Aid over the toe for the next several days but keep open to air in the evenings.  Monitor for signs of infection this would include fever, increasing toe pain, increasing toe swelling or redness or pus drainage from the wound.  Seek medical attention immediately if any of these happen

## 2024-02-17 NOTE — PROCEDURES
Nail Removal    Date/Time: 2/17/2024 10:35 AM    Performed by: Renuka Domingo NP  Authorized by: Renuka Domingo NP    Consent Done?:  Yes (Written)  Location:     Location:  Left foot    Location detail:  Left big toe  Anesthesia:     Anesthesia:  Digital block    Local anesthetic:  Lidocaine 1% without epinephrine    Anesthetic total (ml):  3  Procedure Details:     Preparation:  Skin prepped with Betadine    Amount removed:  Partial    Side:  Lateral    Wedge excision of skin of nail fold: Yes      Nail bed sutured?: No      Nail matrix removed:  None    Dressing applied:  Antibiotic ointment    Patient tolerance:  Patient tolerated the procedure well with no immediate complications

## 2025-04-14 ENCOUNTER — OFFICE VISIT (OUTPATIENT)
Dept: URGENT CARE | Facility: CLINIC | Age: 25
End: 2025-04-14
Payer: COMMERCIAL

## 2025-04-14 VITALS
DIASTOLIC BLOOD PRESSURE: 74 MMHG | TEMPERATURE: 99 F | SYSTOLIC BLOOD PRESSURE: 114 MMHG | OXYGEN SATURATION: 98 % | WEIGHT: 152 LBS | BODY MASS INDEX: 26.93 KG/M2 | HEIGHT: 63 IN | RESPIRATION RATE: 18 BRPM | HEART RATE: 83 BPM

## 2025-04-14 DIAGNOSIS — R21 RASH: Primary | ICD-10-CM

## 2025-04-14 PROCEDURE — 99213 OFFICE O/P EST LOW 20 MIN: CPT | Mod: ,,, | Performed by: PHYSICIAN ASSISTANT

## 2025-04-14 RX ORDER — TRIAMCINOLONE ACETONIDE 1 MG/G
CREAM TOPICAL 3 TIMES DAILY
Qty: 15 G | Refills: 1 | Status: SHIPPED | OUTPATIENT
Start: 2025-04-14 | End: 2025-04-21

## 2025-04-14 RX ORDER — NORGESTIMATE AND ETHINYL ESTRADIOL 0.25-0.035
1 KIT ORAL
COMMUNITY
Start: 2025-04-06

## 2025-04-14 RX ORDER — HYDROXYZINE PAMOATE 50 MG/1
50 CAPSULE ORAL EVERY 8 HOURS PRN
Qty: 15 CAPSULE | Refills: 0 | Status: SHIPPED | OUTPATIENT
Start: 2025-04-14 | End: 2025-04-19

## 2025-04-14 RX ORDER — PREDNISONE 10 MG/1
10 TABLET ORAL DAILY
Qty: 5 TABLET | Refills: 0 | Status: SHIPPED | OUTPATIENT
Start: 2025-04-14 | End: 2025-04-19

## 2025-04-14 NOTE — PROGRESS NOTES
"Subjective:      Patient ID: Aubrie Donald is a 25 y.o. female.    Vitals:  height is 5' 3" (1.6 m) and weight is 68.9 kg (152 lb). Her temperature is 98.5 °F (36.9 °C). Her blood pressure is 114/74 and her pulse is 83. Her respiration is 18 and oxygen saturation is 98%.     Chief Complaint: Rash    Female reports having rash to central chest over the last 5 days itching applying over-the-counter cortisone cream and calamine lotion with mild relief presents to urgent Care for initial evaluation.  Patient reports working out in gym routinely no recent chest wall contact with equipment and for aches daily in department sitting at computer no recent chest wall contact.  Patient reports having stress rash reaction 1 year ago improved with OTC.  Patient denies fever redness swelling blistering or drainage.    Rash  This is a new problem. Pertinent negatives include no fever.     Constitution: Negative for fever.   Skin:  Positive for rash. Negative for wound, abrasion, lesion, erythema, bruising and abscess.      Objective:     Physical Exam   Constitutional:  Non-toxic appearance.      Comments:Awake alert ambulatory female     Cardiovascular: Normal rate.   Musculoskeletal: Normal range of motion.         General: Normal range of motion.   Neurological: She is alert.   Skin: Skin is warm, dry, not diaphoretic and rash. No erythema   Psychiatric: Her mood appears anxious.   Nursing note and vitals reviewed.chaperone present (URIEL Manuel)         Assessment:     1. Rash        Plan:   Discuss with patient concern for faint rash possible contact dermatitis with no appearance of cellulitis or viral outbreak.  Discuss with patient discharge plan with symptomatic Rx in addition to OTC continued monitoring and follow-up.    Concern for contact dermatitis rash.    Recommend daily non sedating antihistamine Claritin loratadine Xyzal or Allegra over the next 1-2 weeks if needed for mild-to-moderate allergies.  Recommend Benadryl " or prescription Vistaril antihistamine if needed for severe allergies itching rash.  Do not take sedating antihistamine and drive or operate machinery.  Recommend cool compresses to irritated areas 2-3 times daily.  May switch to triamcinolone steroid cream if needed to help reduce itching and irritation to help avoid deep scratching wounds which could potentially get infected.    Recommend follow-up with primary care physician in 1-2 weeks for re-evaluation if not improving  Rash    Other orders  -     triamcinolone acetonide 0.1% (KENALOG) 0.1 % cream; Apply topically 3 (three) times daily. for 7 days  Dispense: 15 g; Refill: 1  -     hydrOXYzine pamoate (VISTARIL) 50 MG Cap; Take 1 capsule (50 mg total) by mouth every 8 (eight) hours as needed (rash or itching).  Dispense: 15 capsule; Refill: 0  -     predniSONE (DELTASONE) 10 MG tablet; Take 1 tablet (10 mg total) by mouth once daily. for 5 days  Dispense: 5 tablet; Refill: 0

## 2025-04-15 NOTE — PATIENT INSTRUCTIONS
Concern for contact dermatitis rash.    Recommend daily non sedating antihistamine Claritin loratadine Xyzal or Allegra over the next 1-2 weeks if needed for mild-to-moderate allergies.  Recommend Benadryl or prescription Vistaril antihistamine if needed for severe allergies itching rash.  Do not take sedating antihistamine and drive or operate machinery.  Recommend cool compresses to irritated areas 2-3 times daily.  May switch to triamcinolone steroid cream if needed to help reduce itching and irritation to help avoid deep scratching wounds which could potentially get infected.    Recommend follow-up with primary care physician in 1-2 weeks for re-evaluation if not improving